# Patient Record
Sex: FEMALE | Race: WHITE | NOT HISPANIC OR LATINO | URBAN - METROPOLITAN AREA
[De-identification: names, ages, dates, MRNs, and addresses within clinical notes are randomized per-mention and may not be internally consistent; named-entity substitution may affect disease eponyms.]

---

## 2017-07-31 ENCOUNTER — ALLSCRIPTS OFFICE VISIT (OUTPATIENT)
Dept: OTHER | Facility: OTHER | Age: 57
End: 2017-07-31

## 2017-07-31 DIAGNOSIS — Z13.6 ENCOUNTER FOR SCREENING FOR CARDIOVASCULAR DISORDERS: ICD-10-CM

## 2017-07-31 DIAGNOSIS — Z13.29 ENCOUNTER FOR SCREENING FOR OTHER SUSPECTED ENDOCRINE DISORDER: ICD-10-CM

## 2017-07-31 DIAGNOSIS — Z12.31 ENCOUNTER FOR SCREENING MAMMOGRAM FOR MALIGNANT NEOPLASM OF BREAST: ICD-10-CM

## 2017-08-01 ENCOUNTER — GENERIC CONVERSION - ENCOUNTER (OUTPATIENT)
Dept: OTHER | Facility: OTHER | Age: 57
End: 2017-08-01

## 2018-01-09 NOTE — PROGRESS NOTES
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 81)    Plan  Asthma, mild    · Ventolin  (90 Base) MCG/ACT Inhalation Aerosol Solution; INHALE 1 TO 2  PUFFS EVERY 4 TO 6 HOURS AS NEEDED  Encounter for screening mammogram for malignant neoplasm of breast    · MAMMO SCREENING BILATERAL W 3D & CAD; Status:Hold For - Scheduling; Requested  for:50Ikn0403;   Need for Tdap vaccination    · Tdap (Adacel)  Screening for cardiovascular condition    · (1) CBC/PLT/DIFF; Status:Active; Requested for:[id number];    · (1) COMPREHENSIVE METABOLIC PANEL; Status:Active; Requested for:44Pvy0058;    · (1) LIPID PANEL, FASTING; Status:Active; Requested for:99Vdv6320;   Screening for thyroid disorder    · (1) TSH WITH FT4 REFLEX; Status:Active; Requested for:97Hia9015;     Discussion/Summary  health maintenance visit Currently, she eats an adequate diet and has an adequate exercise regimen  cervical cancer screening is current Breast cancer screening: the risks and benefits of breast cancer screening were discussed, self breast exam technique was taught, monthly self breast exam was advised and mammogram has been ordered  Colorectal cancer screening: colorectal cancer screening is current  Osteoporosis screening: bone mineral density testing is not indicated  Screening lab work includes hemoglobin, glucose, lipid profile and thyroid function testing  The risks and benefits of immunizations were discussed, immunizations are needed and immunizations will be given as outlined in the orders   Advice and education were given regarding nutrition, aerobic exercise, weight bearing exercise and reproductive health  Patient discussion: discussed with the patient  Will have patient sign medical release form to obtain prior medical records  Pap and colonoscopy UTD  To obtain mammogram and lab work  Will call with results  Continue care with Dr Stephane Mercado for asthma/allergies  Tetanus administered today  Follow up PRN or in 1 year for next PE  The patient was counseled regarding instructions for management, risk factor reductions, prognosis, patient and family education, impressions, risks and benefits of treatment options, importance of compliance with treatment  Immunization Counseling The parent/guardian was counseled on the following vaccine components: Tdap  Total number of vaccine components counseled: 1  Possible side effects of new medications were reviewed with the patient/guardian today  The treatment plan was reviewed with the patient/guardian  The patient/guardian understands and agrees with the treatment plan      Chief Complaint  Establishing as a new patient      History of Present Illness  HM, Adult Female: The patient is being seen for a health maintenance evaluation  General Health: The patient's health since the last visit is described as good  She has regular dental visits  She denies vision problems  Vision care includes wearing reading glasses  She denies hearing loss  Immunizations status: not up to date The patient needs the following immunization(s): tetanus vaccine  Lifestyle:  She consumes a diverse and healthy diet  She exercises regularly  She exercises 6 times per week for 90 minutes per session  Exercise includes aerobic conditioning and strength training  She does not use tobacco  She consumes alcohol  She reports occasional alcohol use  She denies drug use     Reproductive health: the patient is postmenopausal    Screening: Cervical cancer screening includes a pap smear performed 1 year ago and last pap test normal  Breast cancer screening includes a mammogram performed 4 years ago  Colorectal cancer screening includes a colonoscopy performed 1 year ago and this was normal    Metabolic screening includes uncertain timing of her last lipid profile, uncertain timing of her last glucose screening and uncertain timing of her last thyroid function test    Additional History:  Reuben Jha presents with her  to establish care  Her past medical history is significant for asthma, diagnosed in January 2017, and an allergy to cats  She is currently connected to Dr Ulices Cho and receiving immunotherapy  Her asthma triggers include hay fever and cats  She will use her rescue inhaler <1/month  Denies recent oral steroids, recent hospitalizations, SOB, wheezing, or waking up at night time coughing  Previously on an inhaled corticosteroid but this was discontinued as her asthma symptoms improved while receiving allergy shots  Review of Systems    Constitutional: No fever, no chills, feels well, no tiredness, no recent weight gain or weight loss  Eyes: No complaints of eye pain, no red eyes, no eyesight problems, no discharge, no dry eyes, no itching of eyes  ENT: no complaints of earache, no loss of hearing, no nose bleeds, no nasal discharge, no sore throat, no hoarseness  Cardiovascular: No complaints of slow heart rate, no fast heart rate, no chest pain, no palpitations, no leg claudication, no lower extremity edema  Respiratory: No complaints of shortness of breath, no wheezing, no cough, no SOB on exertion, no orthopnea, no PND  Gastrointestinal: No complaints of abdominal pain, no constipation, no nausea or vomiting, no diarrhea, no bloody stools  Genitourinary: No complaints of dysuria, no incontinence, no pelvic pain, no dysmenorrhea, no vaginal discharge or bleeding  Musculoskeletal: No complaints of arthralgias, no myalgias, no joint swelling or stiffness, no limb pain or swelling     Integumentary: No complaints of skin rash or lesions, no itching, no skin wounds, no breast pain or lump  Neurological: No complaints of headache, no confusion, no convulsions, no numbness, no dizziness or fainting, no tingling, no limb weakness, no difficulty walking  Psychiatric: Not suicidal, no sleep disturbance, no anxiety or depression, no change in personality, no emotional problems  Endocrine: No complaints of proptosis, no hot flashes, no muscle weakness, no deepening of the voice, no feelings of weakness  Hematologic/Lymphatic: No complaints of swollen glands, no swollen glands in the neck, does not bleed easily, does not bruise easily  Active Problems    1  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 81)   2  Asthma, mild (493 90) (J45 998)    Family History  Mother    · Family history of bipolar disorder (V17 0) (Z81 8)   · Family history of cardiac disorder (V17 49) (Z82 49)   · Family history of malignant neoplasm of uterus (V16 49) (Z80 49)   · Denied: Family history of substance abuse  Father    · Family history of malignant neoplasm of urinary bladder (V16 52) (Z80 52)    Social History    · Alcohol use (V49 89) (Z78 9)   · Always uses seat belt   · Caffeine use (V49 89) (F15 90)   · Employed   · Exercises regularly   · Former smoker (V60 50) (R49 334)   ·    · Seeing a dentist    Allergies    1  No Known Drug Allergies    Vitals   Recorded: [id number] 08:32AM   Heart Rate 51   Systolic 215   Diastolic 60   Height 5 ft 2 7 in   Weight 124 lb    BMI Calculated 22 18   BSA Calculated 1 57   O2 Saturation 98     Physical Exam    Constitutional   General appearance: No acute distress, well appearing and well nourished  Head and Face   Head and face: Normal     Eyes   Conjunctiva and lids: No swelling, erythema or discharge  Pupils and irises: Equal, round, reactive to light  Ears, Nose, Mouth, and Throat   External inspection of ears and nose: Normal     Otoscopic examination: Tympanic membranes translucent with normal light reflex   Canals patent without erythema  Hearing grossly intact  Nasal mucosa, septum, and turbinates: Normal without edema or erythema  Lips, teeth, and gums: Normal, good dentition  Oropharynx: Normal with no erythema, edema, exudate or lesions  Neck   Neck: Supple, symmetric, trachea midline, no masses  Thyroid: Normal, no thyromegaly  Pulmonary   Respiratory effort: No increased work of breathing or signs of respiratory distress  Auscultation of lungs: Clear to auscultation  Cardiovascular   Auscultation of heart: Normal rate and rhythm, normal S1 and S2, no murmurs  Examination of extremities for edema and/or varicosities: Normal     Abdomen   Abdomen: Non-tender, no masses  Liver and spleen: No hepatomegaly or splenomegaly  Lymphatic   Palpation of lymph nodes in neck: No lymphadenopathy  Musculoskeletal   Gait and station: Normal     Digits and nails: Normal without clubbing or cyanosis  Joints, bones, and muscles: Normal     Range of motion: Normal     Stability: Normal     Muscle strength/tone: Normal     Skin   Skin and subcutaneous tissue: Normal without rashes or lesions  Neurologic   Cranial nerves: Cranial nerves II-XII intact  Sensation: No sensory loss  Psychiatric   Judgment and insight: Normal     Orientation to person, place, and time: Normal     Mood and affect: Normal        Results/Data  PHQ-9 Adult Depression Screening [id number] 09:05AM User, s     Test Name Result Flag Reference   PHQ-9 Adult Depression Score 0     Over the last two weeks, how often have you been bothered by any of the following problems?   Little interest or pleasure in doing things: Not at all - 0  Feeling down, depressed, or hopeless: Not at all - 0  Trouble falling or staying asleep, or sleeping too much: Not at all - 0  Feeling tired or having little energy: Not at all - 0  Poor appetite or over eating: Not at all - 0  Feeling bad about yourself - or that you are a failure or have let yourself or your family down: Not at all - 0  Trouble concentrating on things, such as reading the newspaper or watching television: Not at all - 0  Moving or speaking so slowly that other people could have noticed   Or the opposite -  being so fidgety or restless that you have been moving around a lot more than usual: Not at all - 0  Thoughts that you would be better off dead, or of hurting yourself in some way: Not at all - 0   PHQ-9 Adult Depression Screening Negative     PHQ-9 Difficulty Level Not difficult at all     PHQ-9 Severity No Depression         Signatures   Electronically signed by : NATI Vargas; Jul 31 2017  9:43AM EST                       (Author)    Electronically signed by : KEYON Hemphill ; Jul 31 2017 10:56AM EST

## 2018-01-14 VITALS
SYSTOLIC BLOOD PRESSURE: 138 MMHG | WEIGHT: 124 LBS | OXYGEN SATURATION: 98 % | HEIGHT: 63 IN | BODY MASS INDEX: 21.97 KG/M2 | DIASTOLIC BLOOD PRESSURE: 60 MMHG | HEART RATE: 51 BPM